# Patient Record
Sex: MALE | Race: WHITE | NOT HISPANIC OR LATINO
[De-identification: names, ages, dates, MRNs, and addresses within clinical notes are randomized per-mention and may not be internally consistent; named-entity substitution may affect disease eponyms.]

---

## 2018-11-15 ENCOUNTER — TRANSCRIPTION ENCOUNTER (OUTPATIENT)
Age: 58
End: 2018-11-15

## 2019-02-04 ENCOUNTER — TRANSCRIPTION ENCOUNTER (OUTPATIENT)
Age: 59
End: 2019-02-04

## 2019-10-28 ENCOUNTER — APPOINTMENT (OUTPATIENT)
Dept: INTERNAL MEDICINE | Facility: CLINIC | Age: 59
End: 2019-10-28
Payer: COMMERCIAL

## 2019-10-28 VITALS
OXYGEN SATURATION: 98 % | HEIGHT: 71 IN | RESPIRATION RATE: 14 BRPM | WEIGHT: 179 LBS | DIASTOLIC BLOOD PRESSURE: 80 MMHG | HEART RATE: 84 BPM | BODY MASS INDEX: 25.06 KG/M2 | TEMPERATURE: 98.8 F | SYSTOLIC BLOOD PRESSURE: 110 MMHG

## 2019-10-28 DIAGNOSIS — Z80.6 FAMILY HISTORY OF LEUKEMIA: ICD-10-CM

## 2019-10-28 DIAGNOSIS — R50.9 FEVER, UNSPECIFIED: ICD-10-CM

## 2019-10-28 DIAGNOSIS — R05 COUGH: ICD-10-CM

## 2019-10-28 DIAGNOSIS — R52 PAIN, UNSPECIFIED: ICD-10-CM

## 2019-10-28 DIAGNOSIS — J40 BRONCHITIS, NOT SPECIFIED AS ACUTE OR CHRONIC: ICD-10-CM

## 2019-10-28 LAB
FLUAV SPEC QL CULT: NEGATIVE
FLUBV AG SPEC QL IA: NEGATIVE
S PYO AG SPEC QL IA: NEGATIVE

## 2019-10-28 PROCEDURE — 87804 INFLUENZA ASSAY W/OPTIC: CPT | Mod: QW

## 2019-10-28 PROCEDURE — 36415 COLL VENOUS BLD VENIPUNCTURE: CPT

## 2019-10-28 PROCEDURE — 99203 OFFICE O/P NEW LOW 30 MIN: CPT | Mod: 25

## 2019-10-28 PROCEDURE — 87880 STREP A ASSAY W/OPTIC: CPT | Mod: QW

## 2019-10-28 NOTE — HISTORY OF PRESENT ILLNESS
[FreeTextEntry8] : Pt went to a PCP on 10/7/19 c/o cough, itchy throat. Pt was prescribed Levaquin 500mg QD for 10 days. However the patient never started the Levaquion.\par Pt is here today c/o persistent cough, dyspnea which are worse at night. Pt is c/o body aches.\par Pt never checked his temperature, but thinks that he has had fevers.

## 2019-10-28 NOTE — PLAN
[FreeTextEntry1] : Start Levaquin 500mg QD for 10 days. Pt has the pills but never started taking it.\par CXR\par RTO 1 week

## 2019-11-01 LAB
ALBUMIN SERPL ELPH-MCNC: 4.5 G/DL
ALP BLD-CCNC: 101 U/L
ALT SERPL-CCNC: 28 U/L
ANION GAP SERPL CALC-SCNC: 13 MMOL/L
AST SERPL-CCNC: 21 U/L
BASOPHILS # BLD AUTO: 0.05 K/UL
BASOPHILS NFR BLD AUTO: 0.7 %
BILIRUB SERPL-MCNC: 0.3 MG/DL
BUN SERPL-MCNC: 16 MG/DL
CALCIUM SERPL-MCNC: 9.8 MG/DL
CHLORIDE SERPL-SCNC: 104 MMOL/L
CO2 SERPL-SCNC: 25 MMOL/L
CREAT SERPL-MCNC: 0.99 MG/DL
CRP SERPL-MCNC: 0.3 MG/DL
EOSINOPHIL # BLD AUTO: 0.18 K/UL
EOSINOPHIL NFR BLD AUTO: 2.5 %
GLUCOSE SERPL-MCNC: 96 MG/DL
HCT VFR BLD CALC: 47.9 %
HGB BLD-MCNC: 14.9 G/DL
IMM GRANULOCYTES NFR BLD AUTO: 0.3 %
LYMPHOCYTES # BLD AUTO: 1.85 K/UL
LYMPHOCYTES NFR BLD AUTO: 25.4 %
MAN DIFF?: NORMAL
MCHC RBC-ENTMCNC: 28.5 PG
MCHC RBC-ENTMCNC: 31.1 GM/DL
MCV RBC AUTO: 91.8 FL
MONOCYTES # BLD AUTO: 0.67 K/UL
MONOCYTES NFR BLD AUTO: 9.2 %
NEUTROPHILS # BLD AUTO: 4.5 K/UL
NEUTROPHILS NFR BLD AUTO: 61.9 %
PLATELET # BLD AUTO: 265 K/UL
POTASSIUM SERPL-SCNC: 4.6 MMOL/L
PROT SERPL-MCNC: 7.6 G/DL
RBC # BLD: 5.22 M/UL
RBC # FLD: 13.2 %
SODIUM SERPL-SCNC: 142 MMOL/L
TSH SERPL-ACNC: 2.99 UIU/ML
WBC # FLD AUTO: 7.27 K/UL

## 2022-10-17 ENCOUNTER — NON-APPOINTMENT (OUTPATIENT)
Age: 62
End: 2022-10-17

## 2022-10-17 DIAGNOSIS — Z80.9 FAMILY HISTORY OF MALIGNANT NEOPLASM, UNSPECIFIED: ICD-10-CM

## 2022-10-17 DIAGNOSIS — Z82.49 FAMILY HISTORY OF ISCHEMIC HEART DISEASE AND OTHER DISEASES OF THE CIRCULATORY SYSTEM: ICD-10-CM

## 2023-01-03 ENCOUNTER — APPOINTMENT (OUTPATIENT)
Dept: HEPATOLOGY | Facility: CLINIC | Age: 63
End: 2023-01-03

## 2023-01-12 ENCOUNTER — APPOINTMENT (OUTPATIENT)
Dept: CARDIOLOGY | Facility: CLINIC | Age: 63
End: 2023-01-12
Payer: COMMERCIAL

## 2023-01-12 VITALS
DIASTOLIC BLOOD PRESSURE: 80 MMHG | BODY MASS INDEX: 25.34 KG/M2 | SYSTOLIC BLOOD PRESSURE: 124 MMHG | WEIGHT: 181 LBS | HEIGHT: 71 IN

## 2023-01-12 PROCEDURE — 99213 OFFICE O/P EST LOW 20 MIN: CPT

## 2023-01-12 NOTE — ASSESSMENT
[FreeTextEntry1] : 62 years old male comes to the office with complaints of sore throat t and hoarseness of voice-consistent with laryngitis.  Patient was given Zithromax

## 2023-01-12 NOTE — HISTORY OF PRESENT ILLNESS
[Cough] : cough [___ Days ago] : [unfilled] days ago [Congestion] : congestion [Sore Throat] : sore throat [FreeTextEntry8] : 62 years old male comes to the office with complaints of sore throat and hoarseness of the voice for the last 7 to 10 days.  No chills and fever

## 2023-03-24 ENCOUNTER — APPOINTMENT (OUTPATIENT)
Dept: CARDIOLOGY | Facility: CLINIC | Age: 63
End: 2023-03-24
Payer: COMMERCIAL

## 2023-03-24 VITALS
BODY MASS INDEX: 24.79 KG/M2 | DIASTOLIC BLOOD PRESSURE: 74 MMHG | TEMPERATURE: 97.5 F | SYSTOLIC BLOOD PRESSURE: 120 MMHG | WEIGHT: 183 LBS | HEART RATE: 80 BPM | OXYGEN SATURATION: 98 % | HEIGHT: 72 IN

## 2023-03-24 DIAGNOSIS — L20.9 ATOPIC DERMATITIS, UNSPECIFIED: ICD-10-CM

## 2023-03-24 DIAGNOSIS — J04.0 ACUTE LARYNGITIS: ICD-10-CM

## 2023-03-24 PROCEDURE — 99213 OFFICE O/P EST LOW 20 MIN: CPT

## 2023-03-24 RX ORDER — ALBUTEROL SULFATE 90 UG/1
108 (90 BASE) AEROSOL, METERED RESPIRATORY (INHALATION) 4 TIMES DAILY
Qty: 1 | Refills: 3 | Status: DISCONTINUED | COMMUNITY
Start: 2019-10-28 | End: 2023-03-24

## 2023-03-24 RX ORDER — AZITHROMYCIN 250 MG/1
250 TABLET, FILM COATED ORAL
Qty: 6 | Refills: 0 | Status: DISCONTINUED | COMMUNITY
Start: 2023-01-12 | End: 2023-03-24

## 2023-03-24 RX ORDER — BENZONATATE 200 MG/1
200 CAPSULE ORAL 3 TIMES DAILY
Qty: 30 | Refills: 0 | Status: DISCONTINUED | COMMUNITY
Start: 2019-10-28 | End: 2023-03-24

## 2023-03-24 RX ORDER — LEVOFLOXACIN 500 MG/1
500 TABLET, FILM COATED ORAL
Qty: 10 | Refills: 0 | Status: DISCONTINUED | COMMUNITY
Start: 2019-10-07 | End: 2023-03-24

## 2023-03-24 RX ORDER — PREDNISONE 20 MG/1
20 TABLET ORAL
Qty: 10 | Refills: 0 | Status: DISCONTINUED | COMMUNITY
Start: 2019-10-28 | End: 2023-03-24

## 2023-03-24 RX ORDER — HYDROCORTISONE 25 MG/G
2.5 OINTMENT TOPICAL TWICE DAILY
Qty: 60 | Refills: 1 | Status: ACTIVE | COMMUNITY
Start: 2023-03-24 | End: 1900-01-01

## 2023-03-24 RX ORDER — CLARITHROMYCIN 250 MG/1
250 TABLET, FILM COATED ORAL
Qty: 20 | Refills: 0 | Status: ACTIVE | COMMUNITY
Start: 2023-03-24 | End: 1900-01-01

## 2023-03-24 NOTE — ASSESSMENT
[FreeTextEntry1] : Patient symptoms are consistent with acute laryngitis.  Patient was given Biaxin 250 mga twice a day for 10 days.  Patient was also given hydrocortisone cream to applied locally in the rashes in the back.  If rashes is not improved patient is to see a dermatologist.  If laryngitis persist patient is to see ear nose and throat specialist this was all discussed with the patient

## 2023-03-24 NOTE — HISTORY OF PRESENT ILLNESS
[FreeTextEntry8] : 62 years old male comes to the office with complaints of hoarseness of the voice for almost 2 weeks.  No significant cough patient is non-smoker.  Patient has rashes in the back

## 2023-08-11 ENCOUNTER — APPOINTMENT (OUTPATIENT)
Dept: CARDIOLOGY | Facility: CLINIC | Age: 63
End: 2023-08-11

## 2023-12-14 ENCOUNTER — APPOINTMENT (OUTPATIENT)
Dept: CARDIOLOGY | Facility: CLINIC | Age: 63
End: 2023-12-14
Payer: COMMERCIAL

## 2023-12-14 ENCOUNTER — NON-APPOINTMENT (OUTPATIENT)
Age: 63
End: 2023-12-14

## 2023-12-14 VITALS
HEIGHT: 72 IN | BODY MASS INDEX: 25.06 KG/M2 | OXYGEN SATURATION: 98 % | TEMPERATURE: 98.7 F | DIASTOLIC BLOOD PRESSURE: 68 MMHG | WEIGHT: 185 LBS | HEART RATE: 68 BPM | SYSTOLIC BLOOD PRESSURE: 120 MMHG

## 2023-12-14 DIAGNOSIS — R00.2 PALPITATIONS: ICD-10-CM

## 2023-12-14 DIAGNOSIS — L20.9 ATOPIC DERMATITIS, UNSPECIFIED: ICD-10-CM

## 2023-12-14 DIAGNOSIS — D10.6 BENIGN NEOPLASM OF NASOPHARYNX: ICD-10-CM

## 2023-12-14 DIAGNOSIS — Z00.00 ENCOUNTER FOR GENERAL ADULT MEDICAL EXAMINATION W/OUT ABNORMAL FINDINGS: ICD-10-CM

## 2023-12-14 PROCEDURE — 93000 ELECTROCARDIOGRAM COMPLETE: CPT

## 2023-12-14 PROCEDURE — 99215 OFFICE O/P EST HI 40 MIN: CPT | Mod: 25

## 2023-12-14 RX ORDER — KETOCONAZOLE 20 MG/G
2 CREAM TOPICAL TWICE DAILY
Qty: 60 | Refills: 1 | Status: ACTIVE | COMMUNITY
Start: 2023-12-14 | End: 1900-01-01

## 2023-12-14 NOTE — HISTORY OF PRESENT ILLNESS
[FreeTextEntry1] :  for comprehensive physical [de-identified] : 63 years old male comes to the office for comprehensive physical.  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

## 2023-12-14 NOTE — PLAN
[FreeTextEntry1] : Patient's lab data is reviewed.  Patient is noted to have elevated SGOT SGPT.  Patient was advised to see gastroenterologist

## 2024-10-01 ENCOUNTER — EMERGENCY (EMERGENCY)
Facility: HOSPITAL | Age: 64
LOS: 1 days | Discharge: HOME | End: 2024-10-01
Attending: EMERGENCY MEDICINE | Admitting: EMERGENCY MEDICINE
Payer: COMMERCIAL

## 2024-10-01 ENCOUNTER — NON-APPOINTMENT (OUTPATIENT)
Age: 64
End: 2024-10-01

## 2024-10-01 VITALS
HEART RATE: 87 BPM | HEIGHT: 72 IN | RESPIRATION RATE: 16 BRPM | DIASTOLIC BLOOD PRESSURE: 79 MMHG | TEMPERATURE: 98 F | OXYGEN SATURATION: 98 % | SYSTOLIC BLOOD PRESSURE: 129 MMHG | WEIGHT: 182.98 LBS

## 2024-10-01 VITALS
DIASTOLIC BLOOD PRESSURE: 79 MMHG | SYSTOLIC BLOOD PRESSURE: 122 MMHG | HEART RATE: 61 BPM | OXYGEN SATURATION: 97 % | TEMPERATURE: 98 F | RESPIRATION RATE: 18 BRPM

## 2024-10-01 DIAGNOSIS — Z98.89 OTHER SPECIFIED POSTPROCEDURAL STATES: Chronic | ICD-10-CM

## 2024-10-01 DIAGNOSIS — R22.0 LOCALIZED SWELLING, MASS AND LUMP, HEAD: Chronic | ICD-10-CM

## 2024-10-01 DIAGNOSIS — K08.409 PARTIAL LOSS OF TEETH, UNSPECIFIED CAUSE, UNSPECIFIED CLASS: Chronic | ICD-10-CM

## 2024-10-01 PROCEDURE — 73630 X-RAY EXAM OF FOOT: CPT

## 2024-10-01 PROCEDURE — 99284 EMERGENCY DEPT VISIT MOD MDM: CPT

## 2024-10-01 PROCEDURE — 99283 EMERGENCY DEPT VISIT LOW MDM: CPT | Mod: 25

## 2024-10-01 PROCEDURE — 73630 X-RAY EXAM OF FOOT: CPT | Mod: 26,LT

## 2024-10-01 RX ORDER — SULFAMETHOXAZOLE AND TRIMETHOPRIM 800; 160 MG/1; MG/1
1 TABLET ORAL ONCE
Refills: 0 | Status: COMPLETED | OUTPATIENT
Start: 2024-10-01 | End: 2024-10-01

## 2024-10-01 RX ORDER — SULFAMETHOXAZOLE AND TRIMETHOPRIM 800; 160 MG/1; MG/1
1 TABLET ORAL
Qty: 14 | Refills: 0
Start: 2024-10-01 | End: 2024-10-07

## 2024-10-01 RX ORDER — MUPIROCIN 2 %
1 OINTMENT (GRAM) TOPICAL
Qty: 1 | Refills: 0
Start: 2024-10-01 | End: 2024-10-14

## 2024-10-01 RX ORDER — L.ACID,PARA/B.BIFIDUM/S.THERM 8B CELL
2 CAPSULE ORAL
Qty: 28 | Refills: 0
Start: 2024-10-01 | End: 2024-10-07

## 2024-10-01 RX ORDER — MUPIROCIN 2 %
1 OINTMENT (GRAM) TOPICAL ONCE
Refills: 0 | Status: COMPLETED | OUTPATIENT
Start: 2024-10-01 | End: 2024-10-01

## 2024-10-01 RX ADMIN — Medication 1 APPLICATION(S): at 23:04

## 2024-10-01 RX ADMIN — SULFAMETHOXAZOLE AND TRIMETHOPRIM 1 TABLET(S): 800; 160 TABLET ORAL at 23:04

## 2024-10-01 NOTE — ED ADULT NURSE NOTE - NSFALLRISKINTERV_ED_ALL_ED
Assistance OOB with selected safe patient handling equipment if applicable/Assistance with ambulation/Communicate fall risk and risk factors to all staff, patient, and family/Monitor gait and stability/Provide visual cue: yellow wristband, yellow gown, etc/Reinforce activity limits and safety measures with patient and family/Call bell, personal items and telephone in reach/Instruct patient to call for assistance before getting out of bed/chair/stretcher/Non-slip footwear applied when patient is off stretcher/[place] to call system/Physically safe environment - no spills, clutter or unnecessary equipment/Purposeful Proactive Rounding/Room/bathroom lighting operational, light cord in reach

## 2024-10-01 NOTE — ED PROVIDER NOTE - OBJECTIVE STATEMENT
64-year-old male with no significant past medical history presents with slipped 4 days ago, causing an abrasion/small laceration to the plantar aspect of his left foot.  The patient delayed presentation until 2 days ago, where he went to urgent care.  The patient was not sutured, but placed on Augmentin.  The patient was sent for an outpatient ultrasound which did not show any foreign body.  The patient came to the ER today because he is having persistent discomfort when he is walking on the foot.  The patient has some mild ecchymosis around the wound, but no active discharge.  No redness or increased swelling.  No numbness or tingling.  No fever or chills.  No aggravating or alleviating factors otherwise noted.  No other acute complaints.

## 2024-10-01 NOTE — ED ADULT TRIAGE NOTE - CHIEF COMPLAINT QUOTE
Came in to ED reports he slipped and injured his left  foot 4 days ago sustaining laceration/ abrasion on the plantar area. Patient went to Urgent care yesterday and started with augmentin and was given tetatus shot. Patient now states not improving unable to walk and painful that prompted to come to ED.

## 2024-10-01 NOTE — ED PROVIDER NOTE - NSICDXPASTSURGICALHX_GEN_ALL_CORE_FT
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

## 2024-10-01 NOTE — ED PROVIDER NOTE - SKIN, MLM
Skin normal color for race, warm, dry and intact.. L Foot: Small abrasion with adjacent 2 cm superficial laceration with minimal separation on the plantar aspect of the foot.  Some surrounding ecchymosis.  No surrounding erythema.  No warmth.  No discharge from the wound.  Minimal tenderness.  Normal distal strength and sensation equal bilaterally.  Normal cap refill.  Normal rest of foot/ankle.  No other acute findings on exam

## 2024-10-01 NOTE — ED PROVIDER NOTE - NSICDXFAMILYHX_GEN_ALL_CORE_FT
FAMILY HISTORY:  Father  Still living? Yes, Estimated age: 71-80  Family history of cataracts, Age at diagnosis: Age Unknown  Family history of diabetes mellitus (DM), Age at diagnosis: Age Unknown    Mother  Still living? Yes, Estimated age: 71-80  Family history of diabetes mellitus (DM), Age at diagnosis: Age Unknown  Family history of hypertension, Age at diagnosis: Age Unknown  Family history of TIAs, Age at diagnosis: Age Unknown

## 2024-10-01 NOTE — ED PROVIDER NOTE - NSICDXPASTMEDICALHX_GEN_ALL_CORE_FT
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 on 06/18/2014    GERD (gastroesophageal reflux disease)     Nasal tumor current    Syncope 2014 , 2 days post - op, treated with IV fluid

## 2024-10-01 NOTE — ED ADULT NURSE NOTE - OBJECTIVE STATEMENT
Patient complaining of  pain in L foot 2/2 abrasion on plantar foot. Patient injured himself on saturday, went to Urgent care, started on augmentin, no relief.

## 2024-10-01 NOTE — ED PROVIDER NOTE - NSFOLLOWUPINSTRUCTIONS_ED_ALL_ED_FT
1. Follow-up with Orthopedics, See referred doctor. Call today / next business day for close, prompt follow-up.  2. Return to Emergency room for any worsening or persistent pain, weakness, numbness, fever, color change to extremity, or any other concerning symptoms.  3.  See attached instruction sheets for additional information, including information regarding signs and symptoms to look out for, reasons to seek immediate care and other important instructions.  4.   Rest, Ice, Elevate injured area  5.   Use crutches as discussed  6.  Use Bactroban cream on the wound daily  7.  Follow-up with the wound care center. Call tomorrow for apointment for prompt follow-up.  [facility] : [phone]  [facility]: [phone]

## 2024-10-01 NOTE — ED PROVIDER NOTE - CARE PROVIDER_API CALL
Tho Reed  Cardiovascular Disease  [address], [address]  [place] [address]  Phone: [phone]  Fax: [phone]  Follow Up Time:

## 2024-10-01 NOTE — ED PROVIDER NOTE - PATIENT PORTAL LINK FT
You can access the FollowMyHealth Patient Portal offered by [facility] by registering at the following website: [email or web address]. By joining [facility]’s FollowMyHealth portal, you will also be able to view your health information using other applications (apps) compatible with our system.

## 2024-10-01 NOTE — ED ADULT NURSE NOTE - NSICDXPASTMEDICALHX_GEN_ALL_CORE_FT
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

## 2024-10-01 NOTE — ED PROVIDER NOTE - CLINICAL SUMMARY MEDICAL DECISION MAKING FREE TEXT BOX
Left foot injury with laceration healing by secondary intention.  Patient on Augmentin, without signs of acute infection.  Will check x-ray, consider crutches , outpatient follow-up

## 2024-10-01 NOTE — ED PROVIDER NOTE - ADDITIONAL NOTES AND INSTRUCTIONS:
NO use of left foot (no walking on it), until cleared by his doctor.  No work for next three days.  Light duty until cleared

## 2024-10-01 NOTE — ED ADULT TRIAGE NOTE - ESI TRIAGE ACUITY LEVEL, MLM
Impression: Open angle with borderline findings, low risk, bilateral: H40.013. Plan: Glaucoma suspect - RNFL OCT scan ordered today shows average thicknesses of  77/77 and no focal thin quadrants in either eye. Humphery Visual Field 24-2 ordered today was reliable both eyes and was without glaucomatous patterns in either eye. Intraocular pressure is normal.  Fundus photos were ordered for next visit. The results of testing was reviewed with the patient. The patients questions were answered and stated they understood the findings and need for regular monitoring. 4

## 2024-10-01 NOTE — ED PROVIDER NOTE - PROGRESS NOTE DETAILS
Discussed with patient regarding the nature of his wound.  The patient feels that there is some slight erythema which is starting to form on the medial aspect of the wound.  Will change antibiotic to Bactrim.  Will also start Bactroban.  Will give the patient crutches, so no direct pressure on the wound while it heals.  Patient will follow-up with his primary care doctor tomorrow for prompt follow-up and further evaluation.

## 2024-10-07 ENCOUNTER — APPOINTMENT (OUTPATIENT)
Dept: CARDIOLOGY | Facility: CLINIC | Age: 64
End: 2024-10-07
Payer: COMMERCIAL

## 2024-10-07 VITALS
HEIGHT: 72 IN | DIASTOLIC BLOOD PRESSURE: 68 MMHG | OXYGEN SATURATION: 98 % | TEMPERATURE: 98.9 F | WEIGHT: 185 LBS | HEART RATE: 81 BPM | SYSTOLIC BLOOD PRESSURE: 120 MMHG | BODY MASS INDEX: 25.06 KG/M2

## 2024-10-07 DIAGNOSIS — L03.116 CELLULITIS OF LEFT LOWER LIMB: ICD-10-CM

## 2024-10-07 PROCEDURE — 99213 OFFICE O/P EST LOW 20 MIN: CPT

## 2025-04-18 ENCOUNTER — NON-APPOINTMENT (OUTPATIENT)
Age: 65
End: 2025-04-18

## 2025-04-18 ENCOUNTER — APPOINTMENT (OUTPATIENT)
Dept: CARDIOLOGY | Facility: CLINIC | Age: 65
End: 2025-04-18
Payer: COMMERCIAL

## 2025-04-18 VITALS
BODY MASS INDEX: 23.98 KG/M2 | HEIGHT: 72 IN | SYSTOLIC BLOOD PRESSURE: 104 MMHG | WEIGHT: 177 LBS | DIASTOLIC BLOOD PRESSURE: 78 MMHG | HEART RATE: 70 BPM | OXYGEN SATURATION: 99 %

## 2025-04-18 DIAGNOSIS — R00.2 PALPITATIONS: ICD-10-CM

## 2025-04-18 DIAGNOSIS — J38.1 POLYP OF VOCAL CORD AND LARYNX: ICD-10-CM

## 2025-04-18 DIAGNOSIS — Z00.00 ENCOUNTER FOR GENERAL ADULT MEDICAL EXAMINATION W/OUT ABNORMAL FINDINGS: ICD-10-CM

## 2025-04-18 LAB
ALBUMIN SERPL ELPH-MCNC: 4.2 G/DL
ALP BLD-CCNC: 123 U/L
ALT SERPL-CCNC: 66 U/L
ANION GAP SERPL CALC-SCNC: 10 MMOL/L
AST SERPL-CCNC: 60 U/L
BILIRUB SERPL-MCNC: 0.4 MG/DL
BUN SERPL-MCNC: 14 MG/DL
CALCIUM SERPL-MCNC: 9 MG/DL
CHLORIDE SERPL-SCNC: 102 MMOL/L
CHOLEST SERPL-MCNC: 148 MG/DL
CO2 SERPL-SCNC: 26 MMOL/L
CREAT SERPL-MCNC: 0.85 MG/DL
EGFRCR SERPLBLD CKD-EPI 2021: 98 ML/MIN/1.73M2
GLUCOSE SERPL-MCNC: 110 MG/DL
HDLC SERPL-MCNC: 47 MG/DL
LDLC SERPL-MCNC: 84 MG/DL
NONHDLC SERPL-MCNC: 101 MG/DL
POTASSIUM SERPL-SCNC: 5.2 MMOL/L
PROT SERPL-MCNC: 8.4 G/DL
PSA SERPL-MCNC: 0.32 NG/ML
SODIUM SERPL-SCNC: 138 MMOL/L
T4 SERPL-MCNC: 8.3 UG/DL
TRIGL SERPL-MCNC: 93 MG/DL
TSH SERPL-ACNC: 1.52 UIU/ML

## 2025-04-18 PROCEDURE — 36415 COLL VENOUS BLD VENIPUNCTURE: CPT

## 2025-04-18 PROCEDURE — 99396 PREV VISIT EST AGE 40-64: CPT

## 2025-04-18 PROCEDURE — 93000 ELECTROCARDIOGRAM COMPLETE: CPT

## 2025-04-19 LAB
HCT VFR BLD CALC: 45.1 %
HGB BLD-MCNC: 14.5 G/DL
MCHC RBC-ENTMCNC: 29.5 PG
MCHC RBC-ENTMCNC: 32.2 G/DL
MCV RBC AUTO: 91.9 FL
PLATELET # BLD AUTO: 162 K/UL
RBC # BLD: 4.91 M/UL
RBC # FLD: 14 %
WBC # FLD AUTO: 4.45 K/UL